# Patient Record
Sex: FEMALE | Race: WHITE
[De-identification: names, ages, dates, MRNs, and addresses within clinical notes are randomized per-mention and may not be internally consistent; named-entity substitution may affect disease eponyms.]

---

## 2017-08-27 ENCOUNTER — HOSPITAL ENCOUNTER (EMERGENCY)
Dept: HOSPITAL 74 - JERFT | Age: 27
Discharge: HOME | End: 2017-08-27
Payer: COMMERCIAL

## 2017-08-27 VITALS — DIASTOLIC BLOOD PRESSURE: 79 MMHG | SYSTOLIC BLOOD PRESSURE: 134 MMHG | HEART RATE: 123 BPM | TEMPERATURE: 101 F

## 2017-08-27 VITALS — BODY MASS INDEX: 31.7 KG/M2

## 2017-08-27 DIAGNOSIS — J02.0: Primary | ICD-10-CM

## 2017-08-27 DIAGNOSIS — Z86.69: ICD-10-CM

## 2017-08-27 DIAGNOSIS — Z86.73: ICD-10-CM

## 2017-08-27 DIAGNOSIS — B95.4: ICD-10-CM

## 2017-08-27 LAB
APPEARANCE UR: (no result)
BILIRUB UR STRIP.AUTO-MCNC: NEGATIVE MG/DL
COLOR UR: (no result)
HYALINE CASTS URNS QL MICRO: 1 /LPF
KETONES UR QL STRIP: NEGATIVE
LEUKOCYTE ESTERASE UR QL STRIP.AUTO: NEGATIVE
MUCOUS THREADS URNS QL MICRO: (no result)
NITRITE UR QL STRIP: NEGATIVE
PH UR: 7 [PH] (ref 5–8)
PROT UR QL STRIP: NEGATIVE
PROT UR QL STRIP: NEGATIVE
RBC # BLD AUTO: 16 /HPF (ref 0–3)
RBC # UR STRIP: (no result) /UL
SP GR UR: >= 1.03 (ref 1–1.02)
UROBILINOGEN UR STRIP-MCNC: (no result) MG/DL (ref 0.2–1)
WBC # UR AUTO: 3 /HPF (ref 3–5)

## 2017-08-27 NOTE — PDOC
History of Present Illness





- General


Chief Complaint: Sore Throat


Stated Complaint: FEVER


Time Seen by Provider: 17 18:51


History Source: Patient


Exam Limitations: No Limitations





- History of Present Illness


Initial Comments: 





17 19:26


26 yr female with c/o fever sore throat since yesterday and low back pain. no 

vomiting or diarrhea, no sick contacts. 


Timing/Duration: 24 hours


Severity: mild





Past History





- Past Medical History


Allergies/Adverse Reactions: 


 Allergies











Allergy/AdvReac Type Severity Reaction Status Date / Time


 


No Known Allergies Allergy   Verified 16 15:57











Home Medications: 


Ambulatory Orders





Amoxicillin - [Amoxicillin 500mg Capsule -] 500 mg PO BID #20 capsule 17 


Ibuprofen 800 mg PO TID PRN #21 tablet 17 








Asthma: No


Cancer: No


Cardiac Disorders: No


CVA: Yes (at 6 months old)


Diabetes: No


HTN: No


Suicide Attempt (Hx): No


Seizures: Yes (6 Months- 7 Y.O.A.)


Thyroid Disease: No





- Reproductive History


 (#): 3


Para: 1


Cervical CA: No


Dysfunctional Uterine Bleeding: No


Ectopic Pregnancy: No


Endometrial CA: No


Polycystic Ovaries: No


Therapeutic (s) & number: Yes


Tubal Ligation: No





- Immunization History


Td Vaccination: Yes


TDAP Vaccination: Yes


Immunization Up to Date: Yes





- Psycho/Social/Smoking Cessation Hx


Anxiety: No


Suicidal Ideation: No


Smoking Status: No


Smoking History: Never smoked


Years of Tobacco Use: 0


Have you smoked in the past 12 months: No


Number of Cigarettes Smoked Daily: 1


Cigars Per Day: 0


Hx Alcohol Use: No


Drug/Substance Use Hx: No


Substance Use Type: None


Hx Substance Use Treatment: No





**Review of Systems





- Review of Systems


Able to Perform ROS?: Yes


Is the patient limited English proficient: No


Constitutional: Yes: Symptoms Reported, Fever


HEENTM: Yes: Symptoms Reported


Respiratory: No: Symptoms reported


Cardiac (ROS): No: Symptoms Reported


ABD/GI: No: Symptoms Reported





*Physical Exam





- Vital Signs


 Last Vital Signs











Temp Pulse Resp BP Pulse Ox


 


 101.0 F H  123 H  20   134/79   100 


 


 17 18:21  17 18:21  17 18:21  17 18:21  17 18:21














- Physical Exam


General Appearance: Yes: Nourished, Appropriately Dressed


HEENT: positive: EOMI, EDGARDO, TMs Normal, Tonsillar Exudate, Tonsillar Erythema, 

Other (uvula midline).  negative: Muffled/Hoarse voice


Neck: positive: Lymphadenopathy (R), Lymphadenopathy (L)


Respiratory/Chest: positive: Lungs Clear, Normal Breath Sounds


Cardiovascular: positive: Regular Rhythm, Regular Rate, Tachycardia (fever)


Gastrointestinal/Abdominal: positive: Normal Bowel Sounds, Soft.  negative: 

Tender


Musculoskeletal: positive: Normal Inspection.  negative: CVA Tenderness, CVA 

Tenderness (R), CVA Tenderness (L)


Extremity: positive: Normal Capillary Refill, Normal Inspection, Normal Range 

of Motion


Integumentary: positive: Normal Color, Dry, Warm


Neurologic: positive: Fully Oriented, Alert, Normal Mood/Affect, Normal Response

, Motor Strength 5/5





Medical Decision Making





- Medical Decision Making


17 19:35


cc: sore throat fever 


     no vomiting or diarrhea


no abd pain, co low back pain no urinary symptoms 


no sick contacts





will check rapid strep 





17 19:36


pt is able to tolerate po water 


dc inst given to pt all questions asked and answered at discharge pt aware to 

return if worse 





17 19:42








17 19:45








*DC/Admit/Observation/Transfer


Diagnosis at time of Disposition: 


 Strep pharyngitis





- Discharge Dispostion


Disposition: HOME


Condition at time of disposition: Good





- Prescriptions


Prescriptions: 


Amoxicillin - [Amoxicillin 500mg Capsule -] 500 mg PO BID #20 capsule


Ibuprofen 800 mg PO TID PRN #21 tablet


 PRN Reason: Moderate Pain





- Referrals


Referrals: 


Cely Shen MD [Primary Care Provider] - 





- Patient Instructions


Additional Instructions: 


gargle with warm salt water 4-5 times a day


take motirn 800mg every 6hrs for fever as needed 


ice pops, jello, juice, water, drink pleanty of fluids 


take the Amoxicillin as directed for 10 days 


follow with ENT  if any worsening symptoms

## 2019-04-20 ENCOUNTER — HOSPITAL ENCOUNTER (EMERGENCY)
Dept: HOSPITAL 74 - JER | Age: 29
Discharge: HOME | End: 2019-04-20
Payer: COMMERCIAL

## 2019-04-20 VITALS — HEART RATE: 86 BPM | TEMPERATURE: 98.2 F | DIASTOLIC BLOOD PRESSURE: 86 MMHG | SYSTOLIC BLOOD PRESSURE: 123 MMHG

## 2019-04-20 VITALS — BODY MASS INDEX: 31.4 KG/M2

## 2019-04-20 DIAGNOSIS — I69.854: ICD-10-CM

## 2019-04-20 DIAGNOSIS — Z86.69: ICD-10-CM

## 2019-04-20 DIAGNOSIS — R10.31: Primary | ICD-10-CM

## 2019-04-20 LAB
ALBUMIN SERPL-MCNC: 3.9 G/DL (ref 3.4–5)
ALP SERPL-CCNC: 84 U/L (ref 45–117)
ALT SERPL-CCNC: 16 U/L (ref 13–61)
ANION GAP SERPL CALC-SCNC: 6 MMOL/L (ref 8–16)
APPEARANCE UR: CLEAR
AST SERPL-CCNC: 10 U/L (ref 15–37)
BACTERIA #/AREA URNS HPF: (no result) /HPF
BASOPHILS # BLD: 0.6 % (ref 0–2)
BILIRUB SERPL-MCNC: 0.4 MG/DL (ref 0.2–1)
BILIRUB UR STRIP.AUTO-MCNC: NEGATIVE MG/DL
BUN SERPL-MCNC: 12 MG/DL (ref 7–18)
CALCIUM SERPL-MCNC: 9.4 MG/DL (ref 8.5–10.1)
CASTS #/AREA URNS LPF: 0 /LPF (ref 0–8)
CHLORIDE SERPL-SCNC: 108 MMOL/L (ref 98–107)
CO2 SERPL-SCNC: 26 MMOL/L (ref 21–32)
COLOR UR: YELLOW
CREAT SERPL-MCNC: 0.6 MG/DL (ref 0.55–1.3)
DEPRECATED RDW RBC AUTO: 13 % (ref 11.6–15.6)
EOSINOPHIL # BLD: 0.5 % (ref 0–4.5)
EPITH CASTS URNS QL MICRO: (no result) /HPF
GLUCOSE SERPL-MCNC: 79 MG/DL (ref 74–106)
HCT VFR BLD CALC: 38.9 % (ref 32.4–45.2)
HGB BLD-MCNC: 13.5 GM/DL (ref 10.7–15.3)
KETONES UR QL STRIP: NEGATIVE
LEUKOCYTE ESTERASE UR QL STRIP.AUTO: (no result)
LYMPHOCYTES # BLD: 19.2 % (ref 8–40)
MCH RBC QN AUTO: 29.8 PG (ref 25.7–33.7)
MCHC RBC AUTO-ENTMCNC: 34.6 G/DL (ref 32–36)
MCV RBC: 86.2 FL (ref 80–96)
MONOCYTES # BLD AUTO: 4.5 % (ref 3.8–10.2)
NEUTROPHILS # BLD: 75.2 % (ref 42.8–82.8)
NITRITE UR QL STRIP: NEGATIVE
PH UR: 6.5 [PH] (ref 5–8)
PLATELET # BLD AUTO: 341 K/MM3 (ref 134–434)
PMV BLD: 7.7 FL (ref 7.5–11.1)
POTASSIUM SERPLBLD-SCNC: 4.1 MMOL/L (ref 3.5–5.1)
PROT SERPL-MCNC: 7.7 G/DL (ref 6.4–8.2)
PROT UR QL STRIP: NEGATIVE
PROT UR QL STRIP: NEGATIVE
RBC # BLD AUTO: (no result) /HPF (ref 0–4)
RBC # BLD AUTO: 4.52 M/MM3 (ref 3.6–5.2)
SODIUM SERPL-SCNC: 139 MMOL/L (ref 136–145)
SP GR UR: 1.02 (ref 1.01–1.03)
UROBILINOGEN UR STRIP-MCNC: 0.2 MG/DL (ref 0.2–1)
WBC # BLD AUTO: 7.3 K/MM3 (ref 4–10)
WBC # UR AUTO: (no result) /HPF (ref 0–5)

## 2019-04-20 NOTE — PDOC
History of Present Illness





- General


History Source: Patient





- History of Present Illness


Timing/Duration: reports: constant


Abdominal Pain Onset Location: reports: flank





<Yemi Jones - Last Filed: 19 16:13>





<Guillermina Harp - Last Filed: 19 18:46>





- General


Chief Complaint: Pain


Stated Complaint: LOWER BACK PAIN


Time Seen by Provider: 19 11:12





Past History





- Past Medical History


Asthma: No


Cancer: No


Cardiac Disorders: No


CVA: Yes (at 6 months old)


COPD: No


Diabetes: No


HTN: No


Seizures: Yes (6 Months- 7 Y.O.A.)


Thyroid Disease: No





- Surgical History


Cholecystectomy:  (gall stones)





- Reproductive History


 (#): 3


Para: 1


Cervical CA: No


Dysfunctional Uterine Bleeding: No


Ectopic Pregnancy: No


Endometrial CA: No


Polycystic Ovaries: No


Therapeutic (s) & number: Yes


Tubal Ligation: No





- Immunization History


Td Vaccination: Yes


TDAP Vaccination: Yes


Immunization Up to Date: Yes





- Suicide/Smoking/Psychosocial Hx


Smoking Status: No


Smoking History: Never smoked


Years of Tobacco Use: 0


Have you smoked in the past 12 months: No


Number of Cigarettes Smoked Daily: 1


Cigars Per Day: 0


Hx Alcohol Use: No


Drug/Substance Use Hx: No


Substance Use Type: None


Hx Substance Use Treatment: No





<Yemi Jones - Last Filed: 19 16:13>





<Guillermina Harp - Last Filed: 19 18:46>





- Past Medical History


Allergies/Adverse Reactions: 


 Allergies











Allergy/AdvReac Type Severity Reaction Status Date / Time


 


No Known Allergies Allergy   Verified 19 11:04











Home Medications: 


Ambulatory Orders





NK [No Known Home Medication]  19 











**Review of Systems





- Review of Systems


Constitutional: No: Chills, Fever


ABD/GI: No: Blood Streaked Bowels, Constipated, Diarrhea, Nausea, Rectal 

Bleeding, Vomiting


: Yes: Flank Pain.  No: Dysuria, Discharge, Frequency, Hematuria





<Yemi Jones  Last Filed: 19 16:13>





*Physical Exam





- Vital Signs


 Last Vital Signs











Temp Pulse Resp BP Pulse Ox


 


 98.2 F   86   18   123/86   99 


 


 19 11:01  19 11:01  19 11:01  19 11:01  19 11:01














- Physical Exam


General Appearance: Yes: Appropriately Dressed.  No: Apparent Distress


HEENT: positive: Normal Voice


Neck: positive: Supple


Respiratory/Chest: negative: Respiratory Distress


Gastrointestinal/Abdominal: positive: Normal Bowel Sounds, Tender (poorly 

localized ttp to R side of abd, neg murpehys, NT over mcburneys), Soft.  

negative: Distended, Guarding, Rebound


Musculoskeletal: negative: CVA Tenderness


Integumentary: positive: Dry, Warm


Neurologic: positive: Fully Oriented, Alert, Normal Mood/Affect





<Yemi Jones - Last Filed: 19 16:13>





- Vital Signs


 Last Vital Signs











Temp Pulse Resp BP Pulse Ox


 


 98.2 F   86   18   123/86   99 


 


 19 11:01  19 11:01  19 11:01  19 11:01  19 11:01














<Guillermina Harp - Last Filed: 19 18:46>





ED Treatment Course





- LABORATORY


CBC & Chemistry Diagram: 


 19 13:26





 19 13:19





<Yemi Jones - Last Filed: 19 16:13>





- LABORATORY


CBC & Chemistry Diagram: 


 19 13:26





 19 13:19





- ADDITIONAL ORDERS


Additional order review: 


 Laboratory  Results











  19





  15:10 13:21 13:19


 


Sodium    139


 


Potassium    4.1


 


Chloride    108 H


 


Carbon Dioxide    26


 


Anion Gap    6 L


 


BUN    12


 


Creatinine    0.6


 


Creat Clearance w eGFR    118.19


 


Random Glucose    79


 


Calcium    9.4


 


Total Bilirubin    0.4


 


AST    10 L


 


ALT    16


 


Alkaline Phosphatase    84


 


Total Protein    7.7


 


Albumin    3.9


 


Serum Pregnancy, Qual   Negative 


 


Urine Color  Yellow  


 


Urine Appearance  Clear  


 


Urine pH  6.5  


 


Ur Specific Gravity  1.025  


 


Urine Protein  Negative  


 


Urine Glucose (UA)  Negative  


 


Urine Ketones  Negative  


 


Urine Blood  3+ H  


 


Urine Nitrite  Negative  


 


Urine Bilirubin  Negative  


 


Urine Urobilinogen  0.2  


 


Ur Leukocyte Esterase  1+ H  


 


Urine WBC (Auto)  2-5  


 


Urine RBC (Auto)  20-50  


 


Urine Casts (Auto)  0  


 


U Epithel Cells (Auto)  2-5  


 


Urine Bacteria (Auto)  Few  


 


Urine HCG, Qual  Negative  








 











  19





  13:26


 


RBC  4.52


 


MCV  86.2


 


MCHC  34.6


 


RDW  13.0  D


 


MPV  7.7


 


Neutrophils %  75.2


 


Lymphocytes %  19.2


 


Monocytes %  4.5


 


Eosinophils %  0.5


 


Basophils %  0.6














<Guillermina Harp - Last Filed: 19 18:46>





Medical Decision Making





- Medical Decision Making





19 13:16


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

/ R lower abd pain radiating to back x 4 days, described pain as "like 

contractions", 6/10 and mostly constant. No dysuria, hemauria, n/v/f/c. No 

change in BM or vaginal discharge. No h/o similar pain per pt





See exam





R flank pain


Biliary colic vs uti/pyelo, less likely gastritis/pancretaitis or appy


-pain control


-labs


-US 











19 13:47





19 16:13


Labs unremarkable. RUQ/renal US read as fatty liver, otherwise no acute finding 

and no gallstones documented. Pt stable for discharge to f/u with her PMD as 

needed











<Yemi Jones - Last Filed: 19 16:13>





*DC/Admit/Observation/Transfer





<Yeim Jones - Last Filed: 19 16:13>





- Attestations


Physician Attestion: 





I reviewed the case with the mid-level practitioner and agree with the mid-

level practitioner's assessment, diagnosis and disposition.








<Guillermina Harp - Last Filed: 19 18:46>


Diagnosis at time of Disposition: 


 Flank pain








- Discharge Dispostion


Disposition: HOME


Condition at time of disposition: Good





- Referrals


Referrals: 


Cely Shen MD [Primary Care Provider] - 





- Patient Instructions


Printed Discharge Instructions:  Low Back Pain


Additional Instructions: 


Your lab work and ultrasound were normal today.


The cause of your side pain might be muscular.


Take Motrin or Tylenol as needed and if symptoms persist, please follow-up with 

your PMD





- Post Discharge Activity


Forms/Work/School Notes:  Back to Work





Addendum entered and electronically signed by Yemi Jones PA  19 16:14

: 


+bld on UA, pt currently on menses

## 2020-07-05 ENCOUNTER — HOSPITAL ENCOUNTER (EMERGENCY)
Dept: HOSPITAL 74 - JER | Age: 30
LOS: 1 days | Discharge: HOME | End: 2020-07-06
Payer: COMMERCIAL

## 2020-07-05 VITALS — TEMPERATURE: 98.9 F

## 2020-07-05 VITALS — BODY MASS INDEX: 30 KG/M2

## 2020-07-05 DIAGNOSIS — N83.201: Primary | ICD-10-CM

## 2020-07-05 LAB
ALBUMIN SERPL-MCNC: 4.4 G/DL (ref 3.4–5)
ALP SERPL-CCNC: 55 U/L (ref 45–117)
ALT SERPL-CCNC: 22 U/L (ref 13–61)
ANION GAP SERPL CALC-SCNC: 12 MMOL/L (ref 8–16)
APPEARANCE UR: (no result)
AST SERPL-CCNC: 10 U/L (ref 15–37)
BACTERIA # UR AUTO: 169 /UL (ref 0–1359)
BASOPHILS # BLD: 0.5 % (ref 0–2)
BILIRUB SERPL-MCNC: 0.4 MG/DL (ref 0.2–1)
BILIRUB UR STRIP.AUTO-MCNC: NEGATIVE MG/DL
BUN SERPL-MCNC: 11 MG/DL (ref 7–18)
CALCIUM SERPL-MCNC: 9.5 MG/DL (ref 8.5–10.1)
CASTS URNS QL MICRO: 0 /UL (ref 0–3.1)
CHLORIDE SERPL-SCNC: 106 MMOL/L (ref 98–107)
CO2 SERPL-SCNC: 22 MMOL/L (ref 21–32)
COLOR UR: YELLOW
CREAT SERPL-MCNC: 0.9 MG/DL (ref 0.55–1.3)
DEPRECATED RDW RBC AUTO: 13.2 % (ref 11.6–15.6)
EOSINOPHIL # BLD: 0.4 % (ref 0–4.5)
EPITH CASTS URNS QL MICRO: 10 /UL (ref 0–25.1)
GLUCOSE SERPL-MCNC: 85 MG/DL (ref 74–106)
HCT VFR BLD CALC: 41 % (ref 32.4–45.2)
HGB BLD-MCNC: 13.7 GM/DL (ref 10.7–15.3)
KETONES UR QL STRIP: (no result)
LEUKOCYTE ESTERASE UR QL STRIP.AUTO: (no result)
LYMPHOCYTES # BLD: 17.4 % (ref 8–40)
MCH RBC QN AUTO: 29.8 PG (ref 25.7–33.7)
MCHC RBC AUTO-ENTMCNC: 33.4 G/DL (ref 32–36)
MCV RBC: 89.1 FL (ref 80–96)
MONOCYTES # BLD AUTO: 6.9 % (ref 3.8–10.2)
NEUTROPHILS # BLD: 74.8 % (ref 42.8–82.8)
NITRITE UR QL STRIP: NEGATIVE
PH UR: 7 [PH] (ref 5–8)
PLATELET # BLD AUTO: 308 K/MM3 (ref 134–434)
PMV BLD: 8.2 FL (ref 7.5–11.1)
POTASSIUM SERPLBLD-SCNC: 3.4 MMOL/L (ref 3.5–5.1)
PROT SERPL-MCNC: 7.8 G/DL (ref 6.4–8.2)
PROT UR QL STRIP: NEGATIVE
PROT UR QL STRIP: NEGATIVE
RBC # BLD AUTO: 4.6 M/MM3 (ref 3.6–5.2)
RBC # BLD AUTO: 53 /UL (ref 0–23.9)
SODIUM SERPL-SCNC: 141 MMOL/L (ref 136–145)
SP GR UR: 1.02 (ref 1.01–1.03)
UROBILINOGEN UR STRIP-MCNC: 1 MG/DL (ref 0.2–1)
WBC # BLD AUTO: 8.8 K/MM3 (ref 4–10)
WBC # UR AUTO: 9 /UL (ref 0–25.8)

## 2020-07-05 PROCEDURE — 3E0337Z INTRODUCTION OF ELECTROLYTIC AND WATER BALANCE SUBSTANCE INTO PERIPHERAL VEIN, PERCUTANEOUS APPROACH: ICD-10-PCS

## 2020-07-05 NOTE — PDOC
Documentation entered by Stu Mitchell SCRIBE, acting as scribe for 

Alejandra Blake MD.








Alejandra Blake MD:  This documentation has been prepared by the Stephen shin Nirvannie, SCRIBE, under my direction and personally reviewed by me in 

its entirety.  I confirm that the documentation accurately reflects all work, 

treatment, procedures, and medical decision making performed by me.  





Attending Attestation





- Resident


Resident Name: DarbyAugustina





- ED Attending Attestation


I have performed the following: I have examined & evaluated the patient, The 

case was reviewed & discussed with the resident, I agree w/resident's findings &

plan, Exceptions are as noted





- HPI


HPI: 





07/05/20 20:10


The patient is a 29 year old female with a significant past medical history of 

CVA (at 6mo residual left sided weakness and left hand contraction) and fatty 

liver disease who presents to the ED with 2 months of intermittent abdominal 

pain. Patient notes recent labs which were within normal limits and has an outpa

tient endoscopy scheduled for tomorrow. Patient notes RLQ abdominal pain near 

the umbilicus, prompting her arrival to the ED. She denies any fevers or chills.










- Physicial Exam


PE: 





07/05/20 19:43


wnwd 28 yo female with c/o RLQ pain


head ncat


neck supple


lungs cta b/l


cvs [id number]


abd mild rlq tenderness to deep palpation. no rebound


skin warm and dry


neuro axox3, chronic  left sided weakness with mild contracture of her hand


07/05/20 19:46








- Medical Decision Making





07/05/20 19:42


pt is being followed by gastroenterology and is scheduled for endoscopy tomorrow




07/06/20 00:16


labs reviewed


negative pregnancy test


ct scan abd/pelvis: involuting rt ovarian cyst, no appendicitis, no sbo





pt referred to gyn


d.c home





Discharge





- Discharge Information


Problems reviewed: Yes


Clinical Impression/Diagnosis: 


 Ovarian cyst, right, Right sided abdominal pain





Condition: Improved


Disposition: HOME





- Follow up/Referral


Referrals: 


Carol Duran MD [Staff Physician] - 





- Patient Discharge Instructions


Patient Printed Discharge Instructions:  DI for Ovarian Cyst


Additional Instructions: 


You came into the ER with abdominal pain. We did CT imaging of your abdomen and 

pelvis which found a 2cm Right ovarian cyst.





We recommend that you follow up with your PCP regarding the R ovarian cyst. You 

may also follow up directly with our OB referral in the next week.





As discussed, please make sure to go to your endoscopy tomorrow. The CT imaging 

does not replace the need to get an endoscopy.





Please return to ED if you have new or worsening symptoms. Thank you!








- Post Discharge Activity

## 2020-07-05 NOTE — PDOC
Rapid Medical Evaluation


Chief Complaint: Pain, Acute


Time Seen by Provider: 07/05/20 18:22


Medical Evaluation: 


                                    Allergies











Allergy/AdvReac Type Severity Reaction Status Date / Time


 


No Known Allergies Allergy   Verified 05/25/20 13:50











07/05/20 18:24


I have performed a brief in-person evaluation of this patient.


The patient presents with a chief complaint of: h/o gallstone present with RT 

flank pain which has been intermittent for 3 days now. report had covid ab test 

which was positive in July 1st. Denies urinary frequency, dysuria or urgency. 

Denies any other sxs. pt did not take anything for the pain. LMP 6/17


Pertinent physical exam findings: afebrile. A&O x 3 in NAD.


I have ordered the following:cbc,ua, ucx, hcg, cmp


The patient will proceed to the ED for further evaluation.





**Discharge Disposition





- Diagnosis


 Rt flank pain








- Discharge Dispostion


Condition at time of disposition: Stable





- Referrals





- Patient Instructions





- Post Discharge Activity

## 2020-07-05 NOTE — PDOC
History of Present Illness





- General


Chief Complaint: Pain, Acute


Stated Complaint: ABDOMINAL PAIN


Time Seen by Provider: 20 18:22





- History of Present Illness


Initial Comments: 





Pt is a 30yo F with h/o fatty liver presents with right sided abdominal pain. 

She states that this is the same pain from her previous visit in May. Pain is 

intermittent, with 3 episodes daily, each lasting for 10-15 minutes. Pain is 

9/10 and sharp in quality. Located in RUQ, R sided periumbilical, and R flank. 

States that pain improves when drinking water and worsens occasionally with 

eating. Denies taking any medications for the pain. Associated with chills, 

fatigue, SOB w/exertion, with 1 episode of diarrhea. Denies fever, n/v, chest 

pain, melena/hematochezia, dysuria. LMP was 2 weeks ago. Denies history of STI.





PCP: Cely Forman


PMH: denies


PSHx: denies


Meds: none reported


All: NKDA


Social Hx: denies smoking, illicit substance use, denies alcohol use 











Past History





- Medical History


Allergies/Adverse Reactions: 


                                    Allergies











Allergy/AdvReac Type Severity Reaction Status Date / Time


 


No Known Allergies Allergy   Verified 20 18:26











Home Medications: 


Ambulatory Orders





NK [No Known Home Medication]  19 








Asthma: No


Cancer: No


Cardiac Disorders: No


CVA: Yes (at 6 months old)


COPD: No


Diabetes: No


HTN: No


Seizures: Yes (6 Months- 7 Y.O.A.)


Thyroid Disease: No





- Surgical History


Cholecystectomy: Yes (gall stones)





- Reproductive History


 (#): 3


Para: 1


Cervical CA: No


Dysfunctional Uterine Bleeding: No


Ectopic Pregnancy: No


Endometrial CA: No


Polycystic Ovaries: No


Therapeutic (s) & number: Yes


Tubal Ligation: No





- Immunization History


Td Vaccination: Yes


TDAP Vaccination: Yes


Immunization Up to Date: Yes





- Psycho-Social/Smoking History


Smoking Status: No


Smoking History: Never smoked


Years of Tobacco Use: 0


Have you smoked in the past 12 months: No


Number of Cigarettes Smoked Daily: 1


Cigars Per Day: 0





- Substance Abuse Hx (Audit-C & DAST Scrn)


How often the patient has a drink containing alcohol: Never


Score: In Men: 4 or > Positive; In Women: 3 or > Positive: 0


Screen Result (Pos requires Nsg. Audit-10AR): Negative





**Review of Systems





- Review of Systems


Comments:: 





ROS


Constitutional: Reports chills, fatigue, Denies fevers


Respiratory: Reports SOB w/exertion.  Denies: Cough, Wheezing


Cardiac: Denies Chest Pain, Palpitations, Edema


GI: Reports abdominal pain, 1 episode of diarrhea, Denies: n/v, constipation


: Denies dysuria, change in frequency, urgency


Neurological: Denies headache, numbness, weakness








*Physical Exam





- Vital Signs


                                Last Vital Signs











Temp Pulse Resp BP Pulse Ox


 


 98.9 F   89   18   141/88   100 


 


 20 18:23  20 18:23  20 18:23  20 18:23  20 18:23














- Physical Exam





General: in no acute distress, well developed, well nourished


Head: normocephalic


Lung: equal breath sounds b/l, CTA b/l, no crackles, wheezes


Heart: RRR, normal S1, S2, no murmurs, rubs, gallops


Abdomen: soft, tender in RUQ, positive Ribera's, normoactive bowel sounds, 

negative Psoas and obturator signs, no CVA tenderness


Extremities: no edema, DP/PT pulses 2+ and symmetric


Skin: warm, dry











ED Treatment Course





- LABORATORY


CBC & Chemistry Diagram: 


                                 20 18:44





                                 20 18:44





- ADDITIONAL ORDERS


Additional order review: 


                               Laboratory  Results











  20





  18:45


 


Urine Color  Yellow


 


Urine Appearance  Cloudy


 


Urine pH  7.0


 


Ur Specific Gravity  1.016


 


Urine Protein  Negative


 


Urine Glucose (UA)  Negative


 


Urine Ketones  1+ H


 


Urine Blood  1+ H


 


Urine Nitrite  Negative


 


Urine Bilirubin  Negative


 


Urine Urobilinogen  1.0


 


Ur Leukocyte Esterase  Trace


 


Urine WBC (Auto)  9


 


Urine RBC (Auto)  53


 


Urine Casts (Auto)  0


 


U Epithel Cells (Auto)  10


 


Urine Bacteria (Auto)  169


 


Urine HCG, Qual  Negative








                                        











  20





  18:44


 


RBC  4.60


 


MCV  89.1


 


MCHC  33.4


 


RDW  13.2


 


MPV  8.2  D


 


Neutrophils %  74.8


 


Lymphocytes %  17.4  D


 


Monocytes %  6.9


 


Eosinophils %  0.4  D


 


Basophils %  0.5














Medical Decision Making





- Medical Decision Making





Ms. Carr is a 30yo F with Hx of fatty liver presents with Right sided 

abdominal pain.





Vital Signs











Temp Pulse Resp BP Pulse Ox


 


 98.9 F   89   18   141/88   100 


 


 20 18:23  20 18:23  20 18:23  20 18:23  20 18:23











DDx: biliary colic, gallstones, appendicitis, urolithiasis





Plan: labs, CT abdomen/pelvis, pain control





MDM: 


- Patient in mild distress, given PO Tylenol with improvement in pain


- Pt is scheduled for outpatient endoscopy tomorrow





Re-assessment: 


- labs WNL


- Patient continues to endorse periumbilical pain, but no guarding, rigidity


- CT unremarkable liver, no gallstones,  no acute intra-abdominal or intrapelvic

process. 2cm Right ovarian cyst 


- patient is aware of CT results, she has endoscopy tomorrow, pt requests to be 

discharged home





Disposition: 


Discharge to home











Discharge





- Discharge Information


Problems reviewed: Yes


Clinical Impression/Diagnosis: 


 Ovarian cyst, right, Right sided abdominal pain





Clinical Impression/Diagnosis: 


 (Ruled Out): Rt flank pain


Condition: Improved


Disposition: HOME





- Admission


No





- Follow up/Referral


Referrals: 


Carol Duran MD [Staff Physician] - 





- Patient Discharge Instructions


Patient Printed Discharge Instructions:  [facility] for Ovarian Cyst


Additional Instructions: 


You came into the ER with abdominal pain. We did CT imaging of your abdomen and 

pelvis which found a 2cm Right ovarian cyst.





We recommend that you follow up with your PCP regarding the R ovarian cyst. You 

may also follow up directly with our OB referral in the next week.





As discussed, please make sure to go to your endoscopy tomorrow. The CT imaging 

does not replace the need to get an endoscopy.





Please return to ED if you have new or worsening symptoms. Thank you!








- Post Discharge Activity

## 2020-07-06 VITALS — DIASTOLIC BLOOD PRESSURE: 85 MMHG | HEART RATE: 84 BPM | SYSTOLIC BLOOD PRESSURE: 120 MMHG

## 2022-08-15 ENCOUNTER — HOSPITAL ENCOUNTER (EMERGENCY)
Dept: HOSPITAL 74 - JER | Age: 32
LOS: 1 days | Discharge: HOME | End: 2022-08-16
Payer: COMMERCIAL

## 2022-08-15 VITALS
SYSTOLIC BLOOD PRESSURE: 117 MMHG | DIASTOLIC BLOOD PRESSURE: 80 MMHG | HEART RATE: 88 BPM | TEMPERATURE: 97.8 F | RESPIRATION RATE: 17 BRPM

## 2022-08-15 VITALS — BODY MASS INDEX: 28.3 KG/M2

## 2022-08-15 DIAGNOSIS — R10.9: Primary | ICD-10-CM

## 2022-08-15 LAB
ALBUMIN SERPL-MCNC: 4 G/DL (ref 3.4–5)
ALP SERPL-CCNC: 73 U/L (ref 45–117)
ALT SERPL-CCNC: 21 U/L (ref 13–61)
ANION GAP SERPL CALC-SCNC: 6 MMOL/L (ref 8–16)
APPEARANCE UR: (no result)
AST SERPL-CCNC: 12 U/L (ref 15–37)
BASOPHILS # BLD: 0.4 % (ref 0–2)
BILIRUB SERPL-MCNC: 0.3 MG/DL (ref 0.2–1)
BILIRUB UR STRIP.AUTO-MCNC: NEGATIVE MG/DL
BUN SERPL-MCNC: 12 MG/DL (ref 7–18)
CALCIUM SERPL-MCNC: 9.1 MG/DL (ref 8.5–10.1)
CHLORIDE SERPL-SCNC: 103 MMOL/L (ref 98–107)
CO2 SERPL-SCNC: 31 MMOL/L (ref 21–32)
COLOR UR: YELLOW
CREAT SERPL-MCNC: 0.8 MG/DL (ref 0.55–1.3)
DEPRECATED RDW RBC AUTO: 12.5 % (ref 11.6–15.6)
EOSINOPHIL # BLD: 0.7 % (ref 0–4.5)
GLUCOSE SERPL-MCNC: 87 MG/DL (ref 74–106)
HCG UR QL: NEGATIVE
HCT VFR BLD CALC: 36.4 % (ref 32.4–45.2)
HGB BLD-MCNC: 12.8 GM/DL (ref 10.7–15.3)
KETONES UR QL STRIP: NEGATIVE
LEUKOCYTE ESTERASE UR QL STRIP.AUTO: NEGATIVE
LIPASE SERPL-CCNC: 169 U/L (ref 73–393)
LYMPHOCYTES # BLD: 20.7 % (ref 8–40)
MCH RBC QN AUTO: 30.1 PG (ref 25.7–33.7)
MCHC RBC AUTO-ENTMCNC: 35 G/DL (ref 32–36)
MCV RBC: 85.9 FL (ref 80–96)
MONOCYTES # BLD AUTO: 6.8 % (ref 3.8–10.2)
NEUTROPHILS # BLD: 71.4 % (ref 42.8–82.8)
NITRITE UR QL STRIP: NEGATIVE
PH UR: 8 [PH] (ref 5–8)
PLATELET # BLD AUTO: 293 10^3/UL (ref 134–434)
PMV BLD: 6.8 FL (ref 7.5–11.1)
PROT SERPL-MCNC: 7.6 G/DL (ref 6.4–8.2)
PROT UR QL STRIP: NEGATIVE
PROT UR QL STRIP: NEGATIVE
RBC # BLD AUTO: 4.24 M/MM3 (ref 3.6–5.2)
SODIUM SERPL-SCNC: 140 MMOL/L (ref 136–145)
SP GR UR: 1.02 (ref 1.01–1.03)
UROBILINOGEN UR STRIP-MCNC: 1 MG/DL (ref 0.2–1)
WBC # BLD AUTO: 8.7 K/MM3 (ref 4–10)

## 2022-08-15 PROCEDURE — 3E033NZ INTRODUCTION OF ANALGESICS, HYPNOTICS, SEDATIVES INTO PERIPHERAL VEIN, PERCUTANEOUS APPROACH: ICD-10-PCS | Performed by: NURSE PRACTITIONER

## 2022-08-15 PROCEDURE — 3E0337Z INTRODUCTION OF ELECTROLYTIC AND WATER BALANCE SUBSTANCE INTO PERIPHERAL VEIN, PERCUTANEOUS APPROACH: ICD-10-PCS | Performed by: NURSE PRACTITIONER

## 2023-01-25 ENCOUNTER — HOSPITAL ENCOUNTER (EMERGENCY)
Dept: HOSPITAL 74 - JER | Age: 33
Discharge: HOME | End: 2023-01-25
Payer: COMMERCIAL

## 2023-01-25 VITALS
RESPIRATION RATE: 17 BRPM | TEMPERATURE: 98.8 F | HEART RATE: 110 BPM | SYSTOLIC BLOOD PRESSURE: 137 MMHG | DIASTOLIC BLOOD PRESSURE: 77 MMHG

## 2023-01-25 VITALS — BODY MASS INDEX: 32.9 KG/M2

## 2023-01-25 DIAGNOSIS — N30.00: Primary | ICD-10-CM

## 2023-01-25 LAB
APPEARANCE UR: (no result)
BACTERIA # UR AUTO: 3673 /UL (ref 0–1359)
BILIRUB UR STRIP.AUTO-MCNC: NEGATIVE MG/DL
CASTS URNS QL MICRO: 0 /UL (ref 0–3.1)
COLOR UR: YELLOW
EPITH CASTS URNS QL MICRO: 25 /UL (ref 0–25.1)
KETONES UR QL STRIP: NEGATIVE
LEUKOCYTE ESTERASE UR QL STRIP.AUTO: (no result)
NITRITE UR QL STRIP: NEGATIVE
PH UR: 6.5 [PH] (ref 5–8)
PROT UR QL STRIP: NEGATIVE
PROT UR QL STRIP: NEGATIVE
RBC # BLD AUTO: 69 /UL (ref 0–23.9)
SP GR UR: 1.02 (ref 1.01–1.03)
UROBILINOGEN UR STRIP-MCNC: 1 MG/DL (ref 0.2–1)
WBC # UR AUTO: 302 /UL (ref 0–25.8)

## 2023-04-29 ENCOUNTER — HOSPITAL ENCOUNTER (EMERGENCY)
Dept: HOSPITAL 74 - JER | Age: 33
Discharge: HOME | End: 2023-04-29
Payer: COMMERCIAL

## 2023-04-29 VITALS
RESPIRATION RATE: 20 BRPM | DIASTOLIC BLOOD PRESSURE: 71 MMHG | HEART RATE: 110 BPM | TEMPERATURE: 98 F | SYSTOLIC BLOOD PRESSURE: 125 MMHG

## 2023-04-29 VITALS — BODY MASS INDEX: 32.5 KG/M2

## 2023-04-29 DIAGNOSIS — O26.892: Primary | ICD-10-CM

## 2023-04-29 DIAGNOSIS — M79.604: ICD-10-CM

## 2023-04-29 DIAGNOSIS — Z3A.18: ICD-10-CM
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
## 2023-09-19 ENCOUNTER — HOSPITAL ENCOUNTER (INPATIENT)
Dept: HOSPITAL 74 - JDEL | Age: 33
LOS: 2 days | Discharge: HOME | DRG: 560 | End: 2023-09-21
Attending: OBSTETRICS & GYNECOLOGY | Admitting: OBSTETRICS & GYNECOLOGY
Payer: COMMERCIAL

## 2023-09-19 VITALS — BODY MASS INDEX: 35.6 KG/M2

## 2023-09-19 DIAGNOSIS — Z3A.38: ICD-10-CM

## 2023-09-19 LAB
ANION GAP SERPL CALC-SCNC: 9 MMOL/L (ref 8–16)
APTT BLD: 29.2 SECONDS (ref 25.2–36.5)
BASOPHILS # BLD: 0.3 % (ref 0–2)
BUN SERPL-MCNC: 5.8 MG/DL (ref 7–18)
CALCIUM SERPL-MCNC: 9.3 MG/DL (ref 8.5–10.1)
CHLORIDE SERPL-SCNC: 107 MMOL/L (ref 98–107)
CO2 SERPL-SCNC: 22 MMOL/L (ref 21–32)
CREAT SERPL-MCNC: 0.6 MG/DL (ref 0.55–1.3)
DEPRECATED RDW RBC AUTO: 13.8 % (ref 11.6–15.6)
EOSINOPHIL # BLD: 0.3 % (ref 0–4.5)
GLUCOSE SERPL-MCNC: 101 MG/DL (ref 74–106)
HCT VFR BLD CALC: 35.6 % (ref 32.4–45.2)
HGB BLD-MCNC: 12.4 GM/DL (ref 10.7–15.3)
INR BLD: 1.03 (ref 0.83–1.09)
LYMPHOCYTES # BLD: 9.6 % (ref 8–40)
MCH RBC QN AUTO: 30.2 PG (ref 25.7–33.7)
MCHC RBC AUTO-ENTMCNC: 34.7 G/DL (ref 32–36)
MCV RBC: 87 FL (ref 80–96)
MONOCYTES # BLD AUTO: 6.2 % (ref 3.8–10.2)
NEUTROPHILS # BLD: 83.6 % (ref 42.8–82.8)
PLATELET # BLD AUTO: 265 10^3/UL (ref 134–434)
PMV BLD: 6.9 FL (ref 7.5–11.1)
POTASSIUM SERPLBLD-SCNC: 3.8 MMOL/L (ref 3.5–5.1)
PT PNL PPP: 11.9 SEC (ref 9.7–13)
RBC # BLD AUTO: 4.1 M/MM3 (ref 3.6–5.2)
SODIUM SERPL-SCNC: 138 MMOL/L (ref 136–145)
WBC # BLD AUTO: 8.8 K/MM3 (ref 4–10)

## 2023-09-19 RX ADMIN — ACETAMINOPHEN PRN MG: 325 TABLET ORAL at 21:52

## 2023-09-19 RX ADMIN — IBUPROFEN PRN MG: 600 TABLET, FILM COATED ORAL at 22:34

## 2023-09-19 RX ADMIN — IBUPROFEN PRN MG: 600 TABLET, FILM COATED ORAL at 18:33

## 2023-09-20 LAB
BASOPHILS # BLD: 0.4 % (ref 0–2)
DEPRECATED RDW RBC AUTO: 14.1 % (ref 11.6–15.6)
EOSINOPHIL # BLD: 0.4 % (ref 0–4.5)
HCT VFR BLD CALC: 34.8 % (ref 32.4–45.2)
HGB BLD-MCNC: 11.9 GM/DL (ref 10.7–15.3)
LYMPHOCYTES # BLD: 9.6 % (ref 8–40)
MCH RBC QN AUTO: 30.2 PG (ref 25.7–33.7)
MCHC RBC AUTO-ENTMCNC: 34.2 G/DL (ref 32–36)
MCV RBC: 88.2 FL (ref 80–96)
MONOCYTES # BLD AUTO: 4.5 % (ref 3.8–10.2)
NEUTROPHILS # BLD: 85.1 % (ref 42.8–82.8)
PLATELET # BLD AUTO: 274 10^3/UL (ref 134–434)
PMV BLD: 7.1 FL (ref 7.5–11.1)
RBC # BLD AUTO: 3.95 M/MM3 (ref 3.6–5.2)
WBC # BLD AUTO: 10.6 K/MM3 (ref 4–10)

## 2023-09-20 RX ADMIN — ACETAMINOPHEN PRN MG: 325 TABLET ORAL at 02:01

## 2023-09-20 RX ADMIN — IBUPROFEN PRN MG: 600 TABLET, FILM COATED ORAL at 09:51

## 2023-09-21 VITALS
SYSTOLIC BLOOD PRESSURE: 125 MMHG | RESPIRATION RATE: 14 BRPM | TEMPERATURE: 97.8 F | DIASTOLIC BLOOD PRESSURE: 78 MMHG | HEART RATE: 78 BPM

## 2023-09-21 RX ADMIN — IBUPROFEN PRN MG: 600 TABLET, FILM COATED ORAL at 09:23

## 2023-09-21 RX ADMIN — IBUPROFEN PRN MG: 600 TABLET, FILM COATED ORAL at 05:50

## 2024-08-06 ENCOUNTER — HOSPITAL ENCOUNTER (EMERGENCY)
Dept: HOSPITAL 74 - JER | Age: 34
Discharge: HOME | End: 2024-08-06
Payer: COMMERCIAL

## 2024-08-06 VITALS
DIASTOLIC BLOOD PRESSURE: 87 MMHG | SYSTOLIC BLOOD PRESSURE: 126 MMHG | HEART RATE: 96 BPM | TEMPERATURE: 98.4 F | RESPIRATION RATE: 18 BRPM

## 2024-08-06 VITALS — BODY MASS INDEX: 30.4 KG/M2

## 2024-08-06 DIAGNOSIS — R10.31: Primary | ICD-10-CM

## 2024-08-06 DIAGNOSIS — N39.0: ICD-10-CM

## 2024-08-06 LAB
ALBUMIN SERPL-MCNC: 4.2 G/DL (ref 3.4–5)
ALP SERPL-CCNC: 76 U/L (ref 45–117)
ALT SERPL-CCNC: 16 U/L (ref 13–61)
ANION GAP SERPL CALC-SCNC: 8 MMOL/L (ref 4–13)
APPEARANCE UR: CLEAR
AST SERPL-CCNC: 13 U/L (ref 15–37)
BACTERIA # UR AUTO: 820 /UL (ref 0–1359)
BASOPHILS # BLD: 0.6 % (ref 0–2)
BILIRUB SERPL-MCNC: 0.4 MG/DL (ref 0.2–1)
BILIRUB UR STRIP.AUTO-MCNC: NEGATIVE MG/DL
BUN SERPL-MCNC: 11.6 MG/DL (ref 7–18)
CALCIUM SERPL-MCNC: 9.4 MG/DL (ref 8.5–10.1)
CASTS URNS QL MICRO: 0 /UL (ref 0–3.1)
CHLORIDE SERPL-SCNC: 108 MMOL/L (ref 98–107)
CO2 SERPL-SCNC: 24 MMOL/L (ref 21–32)
COLOR UR: (no result)
CREAT SERPL-MCNC: 0.9 MG/DL (ref 0.55–1.3)
DEPRECATED RDW RBC AUTO: 13.1 % (ref 11.6–15.6)
EOSINOPHIL # BLD: 0.5 % (ref 0–4.5)
EPITH CASTS URNS QL MICRO: 27 /UL (ref 0–25.1)
GLUCOSE SERPL-MCNC: 116 MG/DL (ref 74–106)
HCT VFR BLD CALC: 37.6 % (ref 32.4–45.2)
HGB BLD-MCNC: 13 GM/DL (ref 10.7–15.3)
KETONES UR QL STRIP: NEGATIVE
LEUKOCYTE ESTERASE UR QL STRIP.AUTO: (no result)
LYMPHOCYTES # BLD: 19.8 % (ref 8–40)
MCH RBC QN AUTO: 29.1 PG (ref 25.7–33.7)
MCHC RBC AUTO-ENTMCNC: 34.7 G/DL (ref 32–36)
MCV RBC: 84.1 FL (ref 80–96)
MONOCYTES # BLD AUTO: 7.4 % (ref 3.8–10.2)
NEUTROPHILS # BLD: 71.7 % (ref 42.8–82.8)
NITRITE UR QL STRIP: NEGATIVE
PH UR: 7 [PH] (ref 5–8)
PLATELET # BLD AUTO: 365 10^3/UL (ref 134–434)
PMV BLD: 7.3 FL (ref 7.5–11.1)
POTASSIUM SERPLBLD-SCNC: 3.6 MMOL/L (ref 3.5–5.1)
PROT SERPL-MCNC: 8.1 G/DL (ref 6.4–8.2)
PROT UR QL STRIP: NEGATIVE
PROT UR QL STRIP: NEGATIVE
RBC # BLD AUTO: 4.47 M/MM3 (ref 3.6–5.2)
RBC # BLD AUTO: 49 /UL (ref 0–23.9)
SODIUM SERPL-SCNC: 139 MMOL/L (ref 136–145)
SP GR UR: 1.02 (ref 1.01–1.03)
UROBILINOGEN UR STRIP-MCNC: 1 MG/DL (ref 0.2–1)
WBC # BLD AUTO: 7.4 K/MM3 (ref 4–10)
WBC # UR AUTO: 86 /UL (ref 0–25.8)

## 2024-08-06 RX ADMIN — ACETAMINOPHEN ONE MG: 500 TABLET, FILM COATED ORAL at 21:41

## 2024-08-06 RX ADMIN — SODIUM CHLORIDE ONE ML: 9 INJECTION, SOLUTION INTRAVENOUS at 21:41

## 2024-08-06 RX ADMIN — CEFTRIAXONE ONE MLS/HR: 500 INJECTION, POWDER, FOR SOLUTION INTRAMUSCULAR; INTRAVENOUS at 22:17
